# Patient Record
Sex: MALE | Race: WHITE
[De-identification: names, ages, dates, MRNs, and addresses within clinical notes are randomized per-mention and may not be internally consistent; named-entity substitution may affect disease eponyms.]

---

## 2021-03-30 ENCOUNTER — HOSPITAL ENCOUNTER (OUTPATIENT)
Dept: HOSPITAL 43 - DL.ENDO | Age: 61
Discharge: HOME | End: 2021-03-30
Attending: INTERNAL MEDICINE
Payer: COMMERCIAL

## 2021-03-30 DIAGNOSIS — Z86.010: ICD-10-CM

## 2021-03-30 DIAGNOSIS — E88.2: ICD-10-CM

## 2021-03-30 DIAGNOSIS — N40.0: ICD-10-CM

## 2021-03-30 DIAGNOSIS — Z12.11: Primary | ICD-10-CM

## 2021-03-30 DIAGNOSIS — Z88.8: ICD-10-CM

## 2021-03-30 DIAGNOSIS — K57.30: ICD-10-CM

## 2021-03-30 DIAGNOSIS — H91.90: ICD-10-CM

## 2021-03-30 DIAGNOSIS — Z98.890: ICD-10-CM

## 2021-03-30 DIAGNOSIS — D12.3: ICD-10-CM

## 2021-03-30 DIAGNOSIS — D12.4: ICD-10-CM

## 2021-03-30 DIAGNOSIS — Z87.19: ICD-10-CM

## 2021-03-30 PROCEDURE — 45385 COLONOSCOPY W/LESION REMOVAL: CPT

## 2021-03-30 NOTE — OR
DATE:  03/30/2021

 

PROCEDURE PERFORMED:  Total colonoscopy, NBI, and multiple cold snare

polypectomies.

 

INSTRUMENT USED:  PCF-H180DL Olympus video colonoscope.

 

PREMEDICATIONS:  Fentanyl 150 mcg intravenous, Versed 4 mg intravenous.  Nasal

O2 cannula.

 

The procedure was done under pulse oximetry, BP recording, and cardiac monitor.

 

INDICATION:  The patient with previous colonic tubular adenomata.  Surveillance

colonoscopic examination is done for detection of any polypoid lesions and

removal, endoscopic hemostasis therapy if needed.

 

DESCRIPTION OF PROCEDURE:  Initial rectal exam showed BPH.  Rigid anoscopy was

normal.  The colonoscope was passed with ease up to the ileocecal area.

Photographs were taken of the cecum, identified by landmarks of appendiceal

orifice and double bulged ileocecal folds, ileocecal lipomatosis noted.  No

bleeding was noted from any of the visualized areas at the commencement of the

examination.  Bowel preparation was found to be adequate, [place] scale 2 in all

the regions, total number 6.  No stricture.  No vascular ectasia.  No large

isolated ulcerations seen.  No evidence of diffuse inflammatory bowel disease in

the form of friability, contact bleeding, or ulcerations.  In the proximal

transverse colon and distal descending colon, 5 mm sized benign-appearing polyps

were noted, NBI views were obtained, photographs were taken, cold snare

polypectomies were done, the tissues were retrieved and sent for histopathology.

Probing the proximal sides of folds and flexures using adequate distention and

clearing up the stool material, withdrawal of the scope was made, cecum to

rectum time over 6 minutes.  No bleeding was noted from any of the visualized

areas at the completion of examination.

 

IMPRESSION:

1. Diverticulosis.

2. Multiple colonic polyps.

3. Ileocecal lipomatosis.

 

The patient tolerated the procedure well.

 

DD:  03/30/2021 08:34:33

DT:  03/30/2021 12:21:01

[facility]

Job #:  [id number]

## 2021-03-30 NOTE — LETTER
2021

 

 

 

 

RE:  COLLIN GAINES

:  1960

 

 

Elizabeth Jenkins PA-C

[address]

[address]

[place] [address].

 

Dear Ms. Jenkins:

 

Mr. Collin Gaines had colonoscopic examination done this morning and he

tolerated the procedure well.  I herewith send a copy of the endoscopy note and

photographs for your review.

 

Thank you.

 

Sincerely,

 

DD:  2021 08:34:33

DT:  2021 12:23:08

[facility]

Job #:  [id number]